# Patient Record
(demographics unavailable — no encounter records)

---

## 2025-01-07 NOTE — HISTORY OF PRESENT ILLNESS
[postmenopausal] : postmenopausal [Y] : Positive pregnancy history [Currently Active] : currently active [Men] : men [Vaginal] : vaginal [Mammogramdate] : 01/24 [TextBox_19] : MARYANA SALAZAR [BreastSonogramDate] : 01/24 [TextBox_25] : DENSE TISSUE [PapSmeardate] : 01/24 [BoneDensityDate] : 01/23 [TextBox_37] : JEAN [ColonoscopyDate] : 2018 [PGHxTotal] : 2 [[facility]] : 2 [[facility]] : 2 [FreeTextEntry1] :